# Patient Record
Sex: FEMALE | Race: WHITE | Employment: PART TIME | URBAN - METROPOLITAN AREA
[De-identification: names, ages, dates, MRNs, and addresses within clinical notes are randomized per-mention and may not be internally consistent; named-entity substitution may affect disease eponyms.]

---

## 2021-08-24 LAB
HBA1C MFR BLD HPLC: 5.7 %
LDL-C, EXTERNAL: 138

## 2021-09-23 ENCOUNTER — OFFICE VISIT (OUTPATIENT)
Dept: CARDIOLOGY CLINIC | Age: 56
End: 2021-09-23
Payer: COMMERCIAL

## 2021-09-23 VITALS
BODY MASS INDEX: 20.57 KG/M2 | WEIGHT: 128 LBS | OXYGEN SATURATION: 100 % | DIASTOLIC BLOOD PRESSURE: 94 MMHG | RESPIRATION RATE: 16 BRPM | SYSTOLIC BLOOD PRESSURE: 150 MMHG | HEIGHT: 66 IN | HEART RATE: 70 BPM

## 2021-09-23 DIAGNOSIS — E78.00 HYPERCHOLESTEREMIA: ICD-10-CM

## 2021-09-23 DIAGNOSIS — Z82.49 FAMILY HISTORY OF EARLY CAD: ICD-10-CM

## 2021-09-23 DIAGNOSIS — I49.1 ECTOPIC ATRIAL RHYTHM: ICD-10-CM

## 2021-09-23 DIAGNOSIS — Z71.89 CARDIAC RISK COUNSELING: Primary | ICD-10-CM

## 2021-09-23 DIAGNOSIS — I10 HYPERTENSION, ESSENTIAL: ICD-10-CM

## 2021-09-23 PROCEDURE — 93000 ELECTROCARDIOGRAM COMPLETE: CPT | Performed by: SPECIALIST

## 2021-09-23 PROCEDURE — 99244 OFF/OP CNSLTJ NEW/EST MOD 40: CPT | Performed by: SPECIALIST

## 2021-09-23 RX ORDER — CYANOCOBALAMIN (VITAMIN B-12) 5000 MCG
TABLET,IMMED, EXTENDED RELEASE, BIPHASIC ORAL
COMMUNITY

## 2021-09-23 RX ORDER — PROGESTERONE 100 MG/1
CAPSULE ORAL
COMMUNITY

## 2021-09-23 RX ORDER — NEBIVOLOL 5 MG/1
TABLET ORAL
COMMUNITY

## 2021-09-23 RX ORDER — BLACK COHOSH ROOT 540 MG
CAPSULE ORAL
COMMUNITY

## 2021-09-23 RX ORDER — BISMUTH SUBSALICYLATE 262 MG
1 TABLET,CHEWABLE ORAL DAILY
COMMUNITY

## 2021-09-23 NOTE — PATIENT INSTRUCTIONS
You have been scheduled for an echocardiogram. Please call [phone] to schedule your Coronary Calcium Score. We will send results via [employer].

## 2021-09-23 NOTE — Clinical Note
9/23/2021    Patient: Portia Pavon   YOB: 1965   Date of Visit: 9/23/2021     Alecia Gonzalez MD  [id number]  [place]    Dear Alecia Gonzalez MD,      Thank you for referring Ms. Portia Pavon to [facility] for evaluation. My notes for this consultation are attached. If you have questions, please do not hesitate to call me. I look forward to following your patient along with you.       Sincerely,    Luke Jin MD

## 2021-09-24 ENCOUNTER — TRANSCRIBE ORDER (OUTPATIENT)
Dept: SCHEDULING | Age: 56
End: 2021-09-24

## 2021-09-24 DIAGNOSIS — Z13.6 SCREENING FOR ISCHEMIC HEART DISEASE: Primary | ICD-10-CM

## 2021-10-06 ENCOUNTER — ANCILLARY PROCEDURE (OUTPATIENT)
Dept: CARDIOLOGY CLINIC | Age: 56
End: 2021-10-06
Payer: COMMERCIAL

## 2021-10-06 ENCOUNTER — HOSPITAL ENCOUNTER (OUTPATIENT)
Dept: CT IMAGING | Age: 56
Discharge: HOME | End: 2021-10-06
Attending: SPECIALIST

## 2021-10-06 VITALS
SYSTOLIC BLOOD PRESSURE: 150 MMHG | WEIGHT: 128 LBS | DIASTOLIC BLOOD PRESSURE: 90 MMHG | BODY MASS INDEX: 20.57 KG/M2 | HEIGHT: 66 IN

## 2021-10-06 DIAGNOSIS — Z82.49 FAMILY HISTORY OF EARLY CAD: ICD-10-CM

## 2021-10-06 DIAGNOSIS — Z13.6 SCREENING FOR ISCHEMIC HEART DISEASE: ICD-10-CM

## 2021-10-06 PROCEDURE — 75571 CT HRT W/O DYE W/CA TEST: CPT

## 2021-10-06 PROCEDURE — 93306 TTE W/DOPPLER COMPLETE: CPT | Performed by: SPECIALIST

## 2021-10-09 LAB
ECHO AO ASC DIAM: 2.73 CM
ECHO AO ROOT DIAM: 2.5 CM
ECHO AV AREA PEAK VELOCITY: 1.99 CM2
ECHO AV AREA VTI: 2.35 CM2
ECHO AV AREA/BSA PEAK VELOCITY: 1.2 CM2/M2
ECHO AV AREA/BSA VTI: 1.4 CM2/M2
ECHO AV MEAN GRADIENT: 5.92 MMHG
ECHO AV PEAK GRADIENT: 12.33 MMHG
ECHO AV PEAK VELOCITY: 175.55 CM/S
ECHO AV VTI: 33.19 CM
ECHO EST RA PRESSURE: 3 MMHG
ECHO IVC PROX: 1.81 CM
ECHO LA AREA 4C: 15.05 CM2
ECHO LA MAJOR AXIS: 3.1 CM
ECHO LA MINOR AXIS: 1.88 CM
ECHO LA VOL 2C: 47.69 ML (ref 22–52)
ECHO LA VOL 4C: 30.23 ML (ref 22–52)
ECHO LA VOL BP: 45.63 ML (ref 22–52)
ECHO LA VOL/BSA BIPLANE: 27.65 ML/M2 (ref 16–28)
ECHO LA VOLUME INDEX A2C: 28.9 ML/M2 (ref 16–28)
ECHO LA VOLUME INDEX A4C: 18.32 ML/M2 (ref 16–28)
ECHO LV E' LATERAL VELOCITY: 13.72 CM/S
ECHO LV E' SEPTAL VELOCITY: 10.93 CM/S
ECHO LV EDV A2C: 81.16 ML
ECHO LV EDV A4C: 81.27 ML
ECHO LV EDV BP: 85.51 ML (ref 56–104)
ECHO LV EDV INDEX A4C: 49.3 ML/M2
ECHO LV EDV INDEX BP: 51.8 ML/M2
ECHO LV EDV NDEX A2C: 49.2 ML/M2
ECHO LV EJECTION FRACTION A2C: 58 PERCENT
ECHO LV EJECTION FRACTION A4C: 50 PERCENT
ECHO LV EJECTION FRACTION BIPLANE: 56.2 PERCENT (ref 55–100)
ECHO LV ESV A2C: 33.85 ML
ECHO LV ESV A4C: 41.03 ML
ECHO LV ESV BP: 37.5 ML (ref 19–49)
ECHO LV ESV INDEX A2C: 20.5 ML/M2
ECHO LV ESV INDEX A4C: 24.9 ML/M2
ECHO LV ESV INDEX BP: 22.7 ML/M2
ECHO LV INTERNAL DIMENSION DIASTOLIC: 4.46 CM (ref 3.9–5.3)
ECHO LV INTERNAL DIMENSION SYSTOLIC: 2.89 CM
ECHO LV IVSD: 0.78 CM (ref 0.6–0.9)
ECHO LV MASS 2D: 97.7 G (ref 67–162)
ECHO LV MASS INDEX 2D: 59.2 G/M2 (ref 43–95)
ECHO LV POSTERIOR WALL DIASTOLIC: 0.66 CM (ref 0.6–0.9)
ECHO LVOT DIAM: 1.84 CM
ECHO LVOT PEAK GRADIENT: 6.89 MMHG
ECHO LVOT PEAK VELOCITY: 131.28 CM/S
ECHO LVOT SV: 78.1 ML
ECHO LVOT VTI: 29.37 CM
ECHO MV A VELOCITY: 70.49 CM/S
ECHO MV AREA PHT: 5.82 CM2
ECHO MV E DECELERATION TIME (DT): 130.27 MS
ECHO MV E VELOCITY: 98.5 CM/S
ECHO MV E/A RATIO: 1.4
ECHO MV E/E' LATERAL: 7.18
ECHO MV E/E' RATIO (AVERAGED): 8.1
ECHO MV E/E' SEPTAL: 9.01
ECHO MV PRESSURE HALF TIME (PHT): 37.78 MS
ECHO RA MAJOR AXIS: 2.99 CM
ECHO RIGHT VENTRICULAR SYSTOLIC PRESSURE (RVSP): 27.6 MMHG
ECHO RV INTERNAL DIMENSION: 2.81 CM
ECHO RV TAPSE: 3.01 CM (ref 1.5–2)
ECHO TV REGURGITANT MAX VELOCITY: 247.99 CM/S
ECHO TV REGURGITANT PEAK GRADIENT: 24.6 MMHG
LA VOL DISK BP: 40.4 ML (ref 22–52)
LVOT MG: 3.75 MMHG

## 2024-04-22 ENCOUNTER — APPOINTMENT (OUTPATIENT)
Facility: HOSPITAL | Age: 59
End: 2024-04-22
Payer: COMMERCIAL

## 2024-04-22 ENCOUNTER — HOSPITAL ENCOUNTER (EMERGENCY)
Facility: HOSPITAL | Age: 59
Discharge: HOME | End: 2024-04-22
Attending: EMERGENCY MEDICINE
Payer: COMMERCIAL

## 2024-04-22 VITALS
BODY MASS INDEX: 26.11 KG/M2 | RESPIRATION RATE: 16 BRPM | SYSTOLIC BLOOD PRESSURE: 121 MMHG | HEIGHT: 57 IN | WEIGHT: 121.03 LBS | OXYGEN SATURATION: 99 % | TEMPERATURE: 98 F | HEART RATE: 67 BPM | DIASTOLIC BLOOD PRESSURE: 77 MMHG

## 2024-04-22 DIAGNOSIS — G45.4 TRANSIENT GLOBAL AMNESIA: Primary | ICD-10-CM

## 2024-04-22 LAB
ALBUMIN SERPL-MCNC: 4.2 G/DL (ref 3.5–5)
ALBUMIN/GLOB SERPL: 1.1 (ref 1.1–2.2)
ALP SERPL-CCNC: 73 U/L (ref 45–117)
ALT SERPL-CCNC: 28 U/L (ref 12–78)
ANION GAP SERPL CALC-SCNC: 9 MMOL/L (ref 5–15)
AST SERPL-CCNC: 28 U/L (ref 15–37)
BASOPHILS # BLD: 0.1 K/UL (ref 0–0.1)
BASOPHILS NFR BLD: 1 % (ref 0–1)
BILIRUB SERPL-MCNC: 0.4 MG/DL (ref 0.2–1)
BUN SERPL-MCNC: 16 MG/DL (ref 6–20)
BUN/CREAT SERPL: 18 (ref 12–20)
CALCIUM SERPL-MCNC: 9.7 MG/DL (ref 8.5–10.1)
CHLORIDE SERPL-SCNC: 102 MMOL/L (ref 97–108)
CO2 SERPL-SCNC: 31 MMOL/L (ref 21–32)
COMMENT:: NORMAL
CREAT SERPL-MCNC: 0.89 MG/DL (ref 0.55–1.02)
DIFFERENTIAL METHOD BLD: ABNORMAL
EOSINOPHIL # BLD: 0 K/UL (ref 0–0.4)
EOSINOPHIL NFR BLD: 0 % (ref 0–7)
ERYTHROCYTE [DISTWIDTH] IN BLOOD BY AUTOMATED COUNT: 14.6 % (ref 11.5–14.5)
GLOBULIN SER CALC-MCNC: 3.9 G/DL (ref 2–4)
GLUCOSE SERPL-MCNC: 101 MG/DL (ref 65–100)
HCT VFR BLD AUTO: 44.7 % (ref 35–47)
HGB BLD-MCNC: 14.1 G/DL (ref 11.5–16)
IMM GRANULOCYTES # BLD AUTO: 0 K/UL (ref 0–0.04)
IMM GRANULOCYTES NFR BLD AUTO: 0 % (ref 0–0.5)
LYMPHOCYTES # BLD: 1.7 K/UL (ref 0.8–3.5)
LYMPHOCYTES NFR BLD: 21 % (ref 12–49)
MCH RBC QN AUTO: 26.4 PG (ref 26–34)
MCHC RBC AUTO-ENTMCNC: 31.5 G/DL (ref 30–36.5)
MCV RBC AUTO: 83.7 FL (ref 80–99)
MONOCYTES # BLD: 0.6 K/UL (ref 0–1)
MONOCYTES NFR BLD: 7 % (ref 5–13)
NEUTS SEG # BLD: 5.7 K/UL (ref 1.8–8)
NEUTS SEG NFR BLD: 71 % (ref 32–75)
NRBC # BLD: 0 K/UL (ref 0–0.01)
NRBC BLD-RTO: 0 PER 100 WBC
PLATELET # BLD AUTO: 225 K/UL (ref 150–400)
PMV BLD AUTO: 11.4 FL (ref 8.9–12.9)
POTASSIUM SERPL-SCNC: 4 MMOL/L (ref 3.5–5.1)
PROT SERPL-MCNC: 8.1 G/DL (ref 6.4–8.2)
RBC # BLD AUTO: 5.34 M/UL (ref 3.8–5.2)
SODIUM SERPL-SCNC: 142 MMOL/L (ref 136–145)
SPECIMEN HOLD: NORMAL
TROPONIN I SERPL HS-MCNC: 7 NG/L (ref 0–51)
WBC # BLD AUTO: 8.1 K/UL (ref 3.6–11)

## 2024-04-22 PROCEDURE — 70450 CT HEAD/BRAIN W/O DYE: CPT

## 2024-04-22 PROCEDURE — 84484 ASSAY OF TROPONIN QUANT: CPT

## 2024-04-22 PROCEDURE — 36415 COLL VENOUS BLD VENIPUNCTURE: CPT

## 2024-04-22 PROCEDURE — 99284 EMERGENCY DEPT VISIT MOD MDM: CPT

## 2024-04-22 PROCEDURE — 85025 COMPLETE CBC W/AUTO DIFF WBC: CPT

## 2024-04-22 PROCEDURE — 80053 COMPREHEN METABOLIC PANEL: CPT

## 2024-04-22 RX ORDER — MULTIVIT-MIN/IRON/FOLIC ACID/K 18-600-40
1 CAPSULE ORAL DAILY
COMMUNITY

## 2024-04-22 RX ORDER — ESTRADIOL 10 UG/1
10 TABLET VAGINAL
COMMUNITY
Start: 2024-02-08

## 2024-04-22 RX ORDER — CYANOCOBALAMIN (VITAMIN B-12) 500 MCG
1 TABLET ORAL DAILY
COMMUNITY

## 2024-04-22 RX ORDER — LANOLIN ALCOHOL/MO/W.PET/CERES
1000 CREAM (GRAM) TOPICAL DAILY
COMMUNITY

## 2024-04-22 RX ORDER — MULTIVITAMIN WITH IRON
250 TABLET ORAL DAILY
COMMUNITY

## 2024-04-22 ASSESSMENT — ENCOUNTER SYMPTOMS
ABDOMINAL PAIN: 0
CHEST TIGHTNESS: 0
SORE THROAT: 0
VOMITING: 0
EYE PAIN: 0
SHORTNESS OF BREATH: 0
BACK PAIN: 0

## 2024-04-22 ASSESSMENT — PAIN - FUNCTIONAL ASSESSMENT: PAIN_FUNCTIONAL_ASSESSMENT: NONE - DENIES PAIN

## 2024-04-22 ASSESSMENT — LIFESTYLE VARIABLES
HOW OFTEN DO YOU HAVE A DRINK CONTAINING ALCOHOL: 4 OR MORE TIMES A WEEK
HOW MANY STANDARD DRINKS CONTAINING ALCOHOL DO YOU HAVE ON A TYPICAL DAY: 1 OR 2

## 2024-04-22 NOTE — ED PROVIDER NOTES
[facility]  EMERGENCY DEPARTMENT ENCOUNTER      Pt Name: Eliza Haley  MRN: [id number]  Birthdate 1965  Date of evaluation: 4/22/2024  Provider: Fede Lantigua MD      HISTORY OF PRESENT ILLNESS      59-year-old female with history of hypertension, hypothyroidism, GERD presenting to the ER for an episode of forgetfulness.  Patient was talking to a friend and had about a 15-minute episode where it seemed like she could not remember anything.  Patient stated she forgot her own daughter was preparing to get  and she was forgetting other simple events from her recent history.  She is a former physical therapist and checked her self to make sure she was not having a stroke.  She said everything still seem to be working fine and the friend she was conversing with felt like she was not remembering things correctly.  About 15 minutes later she snapped out of it and felt totally normal again.  She also states she had a syncopal event about 2 weeks ago that was witnessed by her .  She was not evaluated for this particular event.  She does states she is an only child and recently found out her dad has cancer, she feels very stressed about all of this news.  And is wondering if that is contributing to her symptoms.              Nursing Notes were reviewed.    REVIEW OF SYSTEMS         Review of Systems   Constitutional:  Negative for chills and fever.   HENT:  Negative for congestion and sore throat.    Eyes:  Negative for pain and visual disturbance.   Respiratory:  Negative for chest tightness and shortness of breath.    Cardiovascular:  Negative for chest pain and leg swelling.   Gastrointestinal:  Negative for abdominal pain and vomiting.   Genitourinary:  Negative for dysuria.   Musculoskeletal:  Negative for back pain.   Skin:  Negative for rash.   Neurological:  Positive for headaches (Slight left posterior). Negative for weakness.   Psychiatric/Behavioral:  Positive for confusion and

## 2024-04-22 NOTE — ED TRIAGE NOTES
Patient reports that she was riding a horse with a friend when around 1050 she was disoriented for about 15 minutes. Pt reported a headache briefly that has subsided now. NIHSS 0. Pt adds that a couple of weeks ago, she passed out after jumping up to let her dog in the house. Pt states that she has a history of esophageal spasms and thinks that is why she passed out. Pt denies seeking medical attention after that episode.

## 2024-04-22 NOTE — ED NOTES
Patient returned from CT. Patient had voided prior to this RN asking for urine sample. Patient now educated that a urine sample is needed. Call bell within reach. Patient reports she will use call bell when having to void to provide urine sample.